# Patient Record
(demographics unavailable — no encounter records)

---

## 2025-06-11 NOTE — REASON FOR VISIT
[FreeTextEntry1] : PCP/Referring Doctor: Ramona Garsia Chief Complaint: "Chest pain" ------------------------------------------------------------------------------------------------------------------------------------ History of Present Illness:   ROS:  chest pain (+), dyspnea with exertion (+), orthopnea (+), edema (+), palpitations (+), dizziness (-) All other systems were reviewed and are negative. ------------------------------------------------------------------------------------------------------------------------------------ Past Medical History: Any history of HTN? N Any history of Lipid disorders? TG  LDL  Any history Diabetes or Prediabetes? Yes prediabetes HbA1c  Any history of MI, stroke or TIA? 3 strokes  Any prior Stress Testing? Yes  Any prior Cath/Angiogram procedure? No Any prior Echocardiogram (TTE)? Yes  Any prior vascular study? No Have patient been on blood thinners? Yes  Have patient had cardiac or vascular surgeries? No ------------------------------------------------------------------------------------------------------------------------------------ Patient's current cardiovascular medications were reviewed and updated in chart. ------------------------------------------------------------------------------------------------------------------------------------ Family history Do you have a family history of heart attacks and/or stroke before the age of 60? No Do you have a family history of cardiac arrest? No  ------------------------------------------------------------------------------------------------------------------------------------ Social History: Do you currently or previously used tobacco including cigarettes and vapes? Former smoker (quit 8 years ago) How many alcoholic drinks per week? 0-3 What is your occupation? Retired ------------------------------------------------------------------------------------------------------------------------------------ Physical Exam: General appearance: NAD Lungs: CTAB CV: RRR Extremities: No peripheral edema.

## 2025-06-11 NOTE — ASSESSMENT
[FreeTextEntry1] : Problems Assessed: 1. Dyspnea on exertion 2.Chest pain due to suspected CAD 3. HTN 4. HLD Plan: - CTCA for further assessment - Event monitor x7 days. Follow-up: 1 month  -----------------------------------------------------------------------------------------

## 2025-06-19 NOTE — HISTORY OF PRESENT ILLNESS
[TextBox_4] :  year old patient presents for evaluation of   LOWER CHEST: Bibasilar linear opacities, possibly atelectasis.        Primary doctor is     PSH:         PMH:             SH:   never smoker   former smoker   active smoker     ETOH:  occasional     Occupation: retired, worked as   No exposure to chemicals, dust, asbestos, mold        ALLERGY:   NKDA   allergic to   Reaction is   environmental/seasonal allergy:       Review of Systems:   No rash, skin problems No dry eyes no mouth ulcers no sinusitis, sinus infections, nasal obstruction no dysphagia no dry mouth   no arthritis no joint aches no joint swelling     no pneumonia no wheeze no lung cancer   no CAD no MI no chest pain no murmur no CHF no HTN no edema   no peptic ulcer or gastritis no GERD no abdominal pain no liver disease   no Diabetes no thyroid disease no hyperlipidemia     no bleeding   no DVT or PE   no kidney disease   no stroke no seizure